# Patient Record
Sex: MALE | Race: WHITE | NOT HISPANIC OR LATINO
[De-identification: names, ages, dates, MRNs, and addresses within clinical notes are randomized per-mention and may not be internally consistent; named-entity substitution may affect disease eponyms.]

---

## 2017-01-11 ENCOUNTER — APPOINTMENT (OUTPATIENT)
Dept: CARDIOLOGY | Facility: CLINIC | Age: 57
End: 2017-01-11

## 2017-04-12 ENCOUNTER — APPOINTMENT (OUTPATIENT)
Dept: CARDIOLOGY | Facility: CLINIC | Age: 57
End: 2017-04-12

## 2017-08-09 ENCOUNTER — APPOINTMENT (OUTPATIENT)
Dept: CARDIOLOGY | Facility: CLINIC | Age: 57
End: 2017-08-09
Payer: COMMERCIAL

## 2017-08-09 PROCEDURE — 99406 BEHAV CHNG SMOKING 3-10 MIN: CPT

## 2017-08-09 PROCEDURE — 99214 OFFICE O/P EST MOD 30 MIN: CPT | Mod: 25

## 2017-11-16 ENCOUNTER — RX RENEWAL (OUTPATIENT)
Age: 57
End: 2017-11-16

## 2017-12-04 ENCOUNTER — MEDICATION RENEWAL (OUTPATIENT)
Age: 57
End: 2017-12-04

## 2018-01-09 ENCOUNTER — RX RENEWAL (OUTPATIENT)
Age: 58
End: 2018-01-09

## 2018-02-09 ENCOUNTER — RECORD ABSTRACTING (OUTPATIENT)
Age: 58
End: 2018-02-09

## 2018-02-09 DIAGNOSIS — R79.9 ABNORMAL FINDING OF BLOOD CHEMISTRY, UNSPECIFIED: ICD-10-CM

## 2018-02-09 DIAGNOSIS — Z82.49 FAMILY HISTORY OF ISCHEMIC HEART DISEASE AND OTHER DISEASES OF THE CIRCULATORY SYSTEM: ICD-10-CM

## 2018-02-14 ENCOUNTER — APPOINTMENT (OUTPATIENT)
Dept: CARDIOLOGY | Facility: CLINIC | Age: 58
End: 2018-02-14
Payer: COMMERCIAL

## 2018-02-14 ENCOUNTER — NON-APPOINTMENT (OUTPATIENT)
Age: 58
End: 2018-02-14

## 2018-02-14 ENCOUNTER — RECORD ABSTRACTING (OUTPATIENT)
Age: 58
End: 2018-02-14

## 2018-02-14 VITALS
SYSTOLIC BLOOD PRESSURE: 126 MMHG | WEIGHT: 179 LBS | RESPIRATION RATE: 16 BRPM | HEIGHT: 72 IN | OXYGEN SATURATION: 98 % | HEART RATE: 78 BPM | BODY MASS INDEX: 24.24 KG/M2 | DIASTOLIC BLOOD PRESSURE: 68 MMHG

## 2018-02-14 DIAGNOSIS — I25.2 OLD MYOCARDIAL INFARCTION: ICD-10-CM

## 2018-02-14 DIAGNOSIS — Z86.79 PERSONAL HISTORY OF OTHER DISEASES OF THE CIRCULATORY SYSTEM: ICD-10-CM

## 2018-02-14 DIAGNOSIS — F17.200 NICOTINE DEPENDENCE, UNSPECIFIED, UNCOMPLICATED: ICD-10-CM

## 2018-02-14 DIAGNOSIS — Z78.9 OTHER SPECIFIED HEALTH STATUS: ICD-10-CM

## 2018-02-14 PROCEDURE — 99214 OFFICE O/P EST MOD 30 MIN: CPT

## 2018-02-14 PROCEDURE — 93000 ELECTROCARDIOGRAM COMPLETE: CPT

## 2018-02-26 ENCOUNTER — APPOINTMENT (OUTPATIENT)
Dept: CARDIOLOGY | Facility: CLINIC | Age: 58
End: 2018-02-26

## 2018-02-28 ENCOUNTER — APPOINTMENT (OUTPATIENT)
Dept: CARDIOLOGY | Facility: CLINIC | Age: 58
End: 2018-02-28

## 2018-03-07 ENCOUNTER — RX RENEWAL (OUTPATIENT)
Age: 58
End: 2018-03-07

## 2018-03-19 ENCOUNTER — APPOINTMENT (OUTPATIENT)
Dept: CARDIOLOGY | Facility: CLINIC | Age: 58
End: 2018-03-19
Payer: COMMERCIAL

## 2018-03-19 PROCEDURE — 93306 TTE W/DOPPLER COMPLETE: CPT

## 2018-03-20 ENCOUNTER — RESULT REVIEW (OUTPATIENT)
Age: 58
End: 2018-03-20

## 2018-03-20 ENCOUNTER — APPOINTMENT (OUTPATIENT)
Dept: CARDIOLOGY | Facility: CLINIC | Age: 58
End: 2018-03-20

## 2018-04-14 ENCOUNTER — MEDICATION RENEWAL (OUTPATIENT)
Age: 58
End: 2018-04-14

## 2018-05-29 ENCOUNTER — MEDICATION RENEWAL (OUTPATIENT)
Age: 58
End: 2018-05-29

## 2018-06-07 ENCOUNTER — MEDICATION RENEWAL (OUTPATIENT)
Age: 58
End: 2018-06-07

## 2018-08-29 ENCOUNTER — APPOINTMENT (OUTPATIENT)
Dept: CARDIOLOGY | Facility: CLINIC | Age: 58
End: 2018-08-29
Payer: SELF-PAY

## 2018-08-29 PROCEDURE — 99214 OFFICE O/P EST MOD 30 MIN: CPT

## 2018-10-12 ENCOUNTER — RX RENEWAL (OUTPATIENT)
Age: 58
End: 2018-10-12

## 2018-10-22 ENCOUNTER — CLINICAL ADVICE (OUTPATIENT)
Age: 58
End: 2018-10-22

## 2018-10-22 DIAGNOSIS — N52.9 MALE ERECTILE DYSFUNCTION, UNSPECIFIED: ICD-10-CM

## 2019-03-07 ENCOUNTER — NON-APPOINTMENT (OUTPATIENT)
Age: 59
End: 2019-03-07

## 2019-03-07 ENCOUNTER — APPOINTMENT (OUTPATIENT)
Dept: CARDIOLOGY | Facility: CLINIC | Age: 59
End: 2019-03-07
Payer: COMMERCIAL

## 2019-03-07 VITALS
HEART RATE: 67 BPM | BODY MASS INDEX: 24.65 KG/M2 | WEIGHT: 182 LBS | OXYGEN SATURATION: 98 % | HEIGHT: 72 IN | DIASTOLIC BLOOD PRESSURE: 70 MMHG | SYSTOLIC BLOOD PRESSURE: 118 MMHG

## 2019-03-07 PROCEDURE — 93000 ELECTROCARDIOGRAM COMPLETE: CPT

## 2019-03-07 PROCEDURE — 99214 OFFICE O/P EST MOD 30 MIN: CPT

## 2019-03-07 RX ORDER — LISINOPRIL 10 MG/1
10 TABLET ORAL DAILY
Qty: 90 | Refills: 3 | Status: DISCONTINUED | COMMUNITY
End: 2019-03-07

## 2019-03-07 NOTE — ASSESSMENT
[FreeTextEntry1] : prior tests for reference:\par Abdominal aortic ultrasound showed no aortic aneurysm.\par \par Echocardiogram 5/11/2016.\par LV ejection fraction 45-50%. LVIDD 5.4 aortic root 3.6 left atrial size 3.3 mild mitral and tricuspid regurgitation. Ascending 3.8 cm.\par \par Myocardial perfusion imaging 6/14/2016 patient exercised for 9 minutes 10 seconds of the John protocol.  No evidence  of ischemia by EKG.  Wall motion analysis demonstrated moderate hypokinesis of the inferior region.  Left ventricular ejection fraction mildly reduced.  No significant change from study on 9/27/2013.  There is a moderate sized area of infarction in the inferior region\par \par  Echocardiogram 1213 2016, EF was around 50-55%. Hypokinetic inferior, inferoseptal region.\par \par He had exercise treadmill stress test. This showed equally local ST-T wave changes. No significant chest pain. No significant cardiac arrhythmias\par \par Reviewed on February 14, 2018.\par EKG ordered and interpreted by me on February 14, 2018. Indication coronary artery disease. Ischemic heart disease. Interpretation by me. Normal sinus rhythm. Inferior infarct of undetermined age.\par Labs January 2018 beers stable CBC, tsh\par August 2017. CBC was stable. The CMP was stable. Triglycerides 40. Serial 89. LDL 43. Total cholesterol 140. \par \par Reviewed on August 29, 2018 \par Echocardiogram. March 19, 2018, EF. 55%. Mild mitral regurgitation. Abnormal wall motion. Normal pulmonary pressures.\par CMP. May 4, 2018. Creatinine 0.87, potassium 4.3, sodium 141, LFT normal\par \par Reviewed on March 7, 2019\par EKG. Normal sinus rhythm. LVH. Nonspecific ST-T changes.\par \par

## 2019-03-07 NOTE — HISTORY OF PRESENT ILLNESS
[FreeTextEntry1] :  57-year-old.\par \par Continued smoking.\par No significant chest pain, PND, orthopnea, or pedal edema.\par Exertional fatigue and shortness of breath, stable.\par No palpitation, dizziness, or syncopal episode.\par Continue to aggressively work without any cardiovascular symptoms.\par Stable atypia.\par No recent hospital admission.\par No changes in medications.\par . He has no pedal edema. Continue to be aggressive with his work. His weight is stable. \par \par Other medical problems, which includes\par  \par Hyperlipidemia on statin therapy.\par \par History of myocardial infarction, status post right coronary artery drug eluding stent placement\par Mild to moderate reduction in LV ejection fraction with ischemic myopathy. Non-dilated. +1 functional status.\par Erectile dysfunction\par Cardiomyopathy: ischemic mild-mod reduction.\par nicotene addiction\par alcohol .

## 2019-03-07 NOTE — PHYSICAL EXAM
[General Appearance - Well Developed] : well developed [Normal Appearance] : normal appearance [Well Groomed] : well groomed [General Appearance - Well Nourished] : well nourished [No Deformities] : no deformities [General Appearance - In No Acute Distress] : no acute distress [Normal Conjunctiva] : the conjunctiva exhibited no abnormalities [No Oral Pallor] : no oral pallor [Normal Jugular Venous A Waves Present] : normal jugular venous A waves present [Normal Jugular Venous V Waves Present] : normal jugular venous V waves present [No Jugular Venous Murphy A Waves] : no jugular venous murphy A waves [Respiration, Rhythm And Depth] : normal respiratory rhythm and effort [Exaggerated Use Of Accessory Muscles For Inspiration] : no accessory muscle use [Auscultation Breath Sounds / Voice Sounds] : lungs were clear to auscultation bilaterally [FreeTextEntry1] : msm 1-2/6 mid systolic murmur. no gallop, rub, heave, click, normal PMI. no bruits. [Heart Rate And Rhythm] : heart rate and rhythm were normal [Heart Sounds] : normal S1 and S2 [Arterial Pulses Normal] : the arterial pulses were normal [Edema] : no peripheral edema present [Veins - Varicosity Changes] : no varicosital changes were noted in the lower extremities [Abdomen Soft] : soft [Abdomen Tenderness] : non-tender [Abnormal Walk] : normal gait [Gait - Sufficient For Exercise Testing] : the gait was sufficient for exercise testing [Nail Clubbing] : no clubbing of the fingernails [Cyanosis, Localized] : no localized cyanosis [Skin Color & Pigmentation] : normal skin color and pigmentation [] : no rash [Oriented To Time, Place, And Person] : oriented to person, place, and time [Affect] : the affect was normal [Mood] : the mood was normal [No Anxiety] : not feeling anxious

## 2019-03-07 NOTE — REASON FOR VISIT
[Follow-Up - Clinic] : a clinic follow-up of [Cardiomyopathy] : cardiomyopathy [Coronary Artery Disease] : coronary artery disease [Hyperlipidemia] : hyperlipidemia [Hypertension] : hypertension [FreeTextEntry1] : Review labs and echocardiogram.58 year old comes in for followup consultation.\par He is being evaluated to have kidney donation for transplant to his brother.\par He needs cardiac evaluation in presence of his history of coronary artery disease, prior myocardial infarction, and interventions.\par He continued to be active, but his job has changed and now he is more driving been doing physical activity as before.\par He has no chest pain. He has no significant PND, orthopnea, palpitation, dizziness, or syncopal episode.\par He has continued smoking in spite of understanding risks, and benefits.\par He has been compliant with his medications.\par He has not had any labs done.\par No recent hospitalization

## 2019-03-07 NOTE — DISCUSSION/SUMMARY
[FreeTextEntry1] : Assessment and suggestions.\par \par #1 coronary artery disease. Native vessel. Prior myocardial infarction. Coronary intervention. Right coronary artery. Initial significant reduction in LV systolic function with ischemic cardiomyopathy. Subsequent improvement in ejection fraction when last checked. No clinical signs of LV systolic dysfunction. Clinical signs of mitral regurgitation noted. His exercise tolerance has decreased.\par He also needs cardiac clearance prior to moderate to high risk surgery in relation to kidney donation.\par He has been recommended to how exercise tolerance test by transplant team.\par He also needs an ejection fraction and presence of history of ischemic cardiomyopathy. Also, followup on severity of mitral regurgitation by echocardiogram.\par A presence of coronary artery disease, prior ischemic cardiomyopathy, prior intervention, abnormal EKG with LVH. He is recommended to have a stress myocardial perfusion scan/nuclear stress test to further evaluate and rule out any significant worsening of his ischemic heart disease and ascertain any perioperative risk more accurately.\par He also needs labs as advised.\par \par Secondary prevention reviewed, which includes\par \par #1 Counseling regarding low saturated fat, salt and carbohydrate intake was reviewed. Active lifestyle and regular. Exercise along with weight management is advised.\par \par Smoking cessation. Counseling done for over 3-5 minutes. Relationship with ASCVD, and associated morbidity, mortality was reviewed. Options available discussed.\par \par Dual antiplatelet agents , due to the recurrent coronary event, and ischemic myopathy along with low risk of bleeding and continued risk factors, were reviewed. \par \par #2 hyperlipidemia. Continue atorvastatin will along with dietary restrictions.\par #3 essential hypertension. . very well controlled. Continue present medications.\par #4 decreasing alcohol intake. Strongly recommended \par #4 high-grade symptoms for which he will contact 911 immediately has been reviewed.\par \par All the above were at length reviewed. Answered all the questions. Thank you very much for this kind referral. Please do not hesitate to give me a call for any question.\par Part of this transcription was done with voice recognition software and phonetically similar errors are common. I apologize for that. Please donot hesitate to call for any questions due to above.\par \par Followup after above tests

## 2019-03-13 ENCOUNTER — APPOINTMENT (OUTPATIENT)
Dept: CARDIOLOGY | Facility: CLINIC | Age: 59
End: 2019-03-13
Payer: COMMERCIAL

## 2019-03-13 DIAGNOSIS — Z78.9 OTHER SPECIFIED HEALTH STATUS: ICD-10-CM

## 2019-03-13 DIAGNOSIS — R06.02 SHORTNESS OF BREATH: ICD-10-CM

## 2019-03-13 PROCEDURE — A9502: CPT

## 2019-03-13 PROCEDURE — 93015 CV STRESS TEST SUPVJ I&R: CPT

## 2019-03-13 PROCEDURE — 78452 HT MUSCLE IMAGE SPECT MULT: CPT

## 2019-03-13 RX ORDER — B-COMPLEX WITH VITAMIN C
TABLET ORAL
Refills: 0 | Status: DISCONTINUED | COMMUNITY
End: 2019-03-13

## 2019-03-22 ENCOUNTER — APPOINTMENT (OUTPATIENT)
Dept: CARDIOLOGY | Facility: CLINIC | Age: 59
End: 2019-03-22
Payer: COMMERCIAL

## 2019-03-22 PROCEDURE — 93306 TTE W/DOPPLER COMPLETE: CPT

## 2019-03-23 ENCOUNTER — RX RENEWAL (OUTPATIENT)
Age: 59
End: 2019-03-23

## 2019-03-29 ENCOUNTER — APPOINTMENT (OUTPATIENT)
Dept: CARDIOLOGY | Facility: CLINIC | Age: 59
End: 2019-03-29
Payer: COMMERCIAL

## 2019-03-29 VITALS
BODY MASS INDEX: 23.86 KG/M2 | SYSTOLIC BLOOD PRESSURE: 128 MMHG | HEART RATE: 66 BPM | WEIGHT: 180 LBS | DIASTOLIC BLOOD PRESSURE: 62 MMHG | HEIGHT: 73 IN

## 2019-03-29 DIAGNOSIS — Z95.5 PRESENCE OF CORONARY ANGIOPLASTY IMPLANT AND GRAFT: ICD-10-CM

## 2019-03-29 PROCEDURE — 99214 OFFICE O/P EST MOD 30 MIN: CPT

## 2019-03-29 NOTE — REASON FOR VISIT
[Follow-Up - Clinic] : a clinic follow-up of [Cardiomyopathy] : cardiomyopathy [Coronary Artery Disease] : coronary artery disease [Hyperlipidemia] : hyperlipidemia [Hypertension] : hypertension [FreeTextEntry1] : PENELOPE ZHOU  is a 58 year M  who presents today Mar 29, 2019 in clinical follow-up to review recent cardiovascular testing. Since last seen there has been no recent illness or hospital stay. Overall feeling well. \par He is being evaluated to have kidney donation for transplant to his brother.\par He continued to be active, but his job has changed and now he is more driving been doing physical activity as before.\par Continued smoking.\par \par Today he denies chest pain, pressure, unusual shortness of breath, lightheadedness, dizziness, near syncope or syncope. \par \par Hyperlipidemia on statin therapy with Atorvastatin 80mg QD.\par \par History of myocardial infarction, status post right coronary artery drug eluding stent placement\par Mild to moderate reduction in LV ejection fraction with ischemic myopathy. \par \par Erectile dysfunction\par \par Cardiomyopathy: ischemic mild-mod reduction. \par \par Echocardiogram March 22, 2019 EF 50-55%, mild MR\par \par Nuclear stress test March 13, 2019 mod defects in inf/inferolateral inferoseptal wall consistent with infarct. No ischemia \par \par Labs March 2019 WBC 7.7, Hgb 14.2, Hct 42, Platelets 254, Sodium 143, potassium 4.7, creatinine 0.86, BUN 19, AST 19, ALT 17, triglyceride 41, HDL 55, LDL 60, total cholesterol 123\par \par Abdominal aortic ultrasound showed no aortic aneurysm.\par \par Echocardiogram 5/11/2016.\par LV ejection fraction 45-50%. LVIDD 5.4 aortic root 3.6 left atrial size 3.3 mild mitral and tricuspid regurgitation. Ascending 3.8 cm.\par \par Myocardial perfusion imaging 6/14/2016 patient exercised for 9 minutes 10 seconds of the John protocol.  No evidence  of ischemia by EKG.  Wall motion analysis demonstrated moderate hypokinesis of the inferior region.  Left ventricular ejection fraction mildly reduced.  No significant change from study on 9/27/2013.  There is a moderate sized area of infarction in the inferior region\par \par  Echocardiogram 1213 2016, EF was around 50-55%. Hypokinetic inferior, inferoseptal region.\par \par EKG ordered and interpreted by me on February 14, 2018. Indication coronary artery disease. Ischemic heart disease. Interpretation by me. Normal sinus rhythm. Inferior infarct of undetermined age.\par Labs January 2018 beers stable CBC, tsh\par August 2017. CBC was stable. The CMP was stable. Triglycerides 40. Serial 89. LDL 43. Total cholesterol 140. \par \par Echocardiogram. March 19, 2018, EF. 55%. Mild mitral regurgitation. Abnormal wall motion. Normal pulmonary pressures.\par CMP. May 4, 2018. Creatinine 0.87, potassium 4.3, sodium 141, LFT normal\par \par EKG. Normal sinus rhythm. LVH. Nonspecific ST-T changes.\par \par \par

## 2019-03-29 NOTE — DISCUSSION/SUMMARY
[FreeTextEntry1] : PENELOPE ZHOU  is a 58 year M  who presents today Mar 29, 2019 with the above history and the following active issues. \par \par Coronary artery disease. Native vessel. Prior myocardial infarction. Coronary intervention. Right coronary artery. Initial significant reduction in LV systolic function with ischemic cardiomyopathy. Subsequent improvement in ejection fraction by echo 3/2019. No clinical signs of LV systolic dysfunction. \par Presence of coronary artery disease, prior ischemic cardiomyopathy, prior intervention, abnormal EKG with LVH. \par Echocardiogram with EF 50-55%. Nuclear stress test without ischemia.\par \par \par Counseling regarding low saturated fat, salt and carbohydrate intake was reviewed. Active lifestyle and regular. Exercise along with weight management is advised.\par \par Smoking cessation. Relationship with ASCVD, and associated morbidity, mortality was reviewed. Options available discussed.\par \par Dual antiplatelet agents , due to the recurrent coronary event, and ischemic myopathy along with low risk of bleeding and continued risk factors, were reviewed. \par \par Hyperlipidemia. Continue atorvastatin will along with dietary restrictions.\par \par Essential hypertension. Very well controlled. Continue present medications.\par \par Preoperative status  \par date to be determined\par At present, there are no active cardiac conditions. \par No recent unstable coronary syndromes, decompensated heart failure, severe valvular heart disease or significant dysrhythmias.  \par Baseline functional status is good.    \par The clinical benefit of the proposed procedure outweighs the associated cardiovascular risk.  \par Risk not attenuated with further CV testing.  \par Prior testing as outlined above.\par Optimized from a cardiovascular perspective.\par Minimize time off ASA\par Continue beta blocker\par \par Red flag symptoms which would warrant sooner emergent evaluation reviewed with the patient. \par Questions and concerns were addressed and answered. \par \par Clinical follow-up with Dr. More in 6 months unless symptoms warrant sooner emergent evaluation\par \par Sincerely,\par \par Crystal Fowler PA-C\par Patients history, testing and plan reviewed with supervising MD: Dr. Nikko Wagoner

## 2019-03-29 NOTE — PHYSICAL EXAM
[General Appearance - Well Developed] : well developed [Normal Appearance] : normal appearance [Well Groomed] : well groomed [General Appearance - Well Nourished] : well nourished [No Deformities] : no deformities [General Appearance - In No Acute Distress] : no acute distress [Normal Conjunctiva] : the conjunctiva exhibited no abnormalities [No Oral Pallor] : no oral pallor [Normal Jugular Venous A Waves Present] : normal jugular venous A waves present [Normal Jugular Venous V Waves Present] : normal jugular venous V waves present [No Jugular Venous Murphy A Waves] : no jugular venous murpyh A waves [Respiration, Rhythm And Depth] : normal respiratory rhythm and effort [Exaggerated Use Of Accessory Muscles For Inspiration] : no accessory muscle use [Auscultation Breath Sounds / Voice Sounds] : lungs were clear to auscultation bilaterally [FreeTextEntry1] : msm 1-2/6 mid systolic murmur. no gallop, rub, heave, click, normal PMI. no bruits. [Heart Rate And Rhythm] : heart rate and rhythm were normal [Heart Sounds] : normal S1 and S2 [Arterial Pulses Normal] : the arterial pulses were normal [Edema] : no peripheral edema present [Veins - Varicosity Changes] : no varicosital changes were noted in the lower extremities [Abdomen Soft] : soft [Abdomen Tenderness] : non-tender [Abnormal Walk] : normal gait [Gait - Sufficient For Exercise Testing] : the gait was sufficient for exercise testing [Nail Clubbing] : no clubbing of the fingernails [Cyanosis, Localized] : no localized cyanosis [Skin Color & Pigmentation] : normal skin color and pigmentation [] : no rash [Oriented To Time, Place, And Person] : oriented to person, place, and time [Affect] : the affect was normal [Mood] : the mood was normal [No Anxiety] : not feeling anxious

## 2019-05-23 ENCOUNTER — MEDICATION RENEWAL (OUTPATIENT)
Age: 59
End: 2019-05-23

## 2019-07-29 ENCOUNTER — MEDICATION RENEWAL (OUTPATIENT)
Age: 59
End: 2019-07-29

## 2019-08-06 ENCOUNTER — EMERGENCY (EMERGENCY)
Facility: HOSPITAL | Age: 59
LOS: 1 days | End: 2019-08-06
Admitting: EMERGENCY MEDICINE
Payer: OTHER MISCELLANEOUS

## 2019-08-06 PROCEDURE — 99282 EMERGENCY DEPT VISIT SF MDM: CPT

## 2019-08-08 ENCOUNTER — MEDICATION RENEWAL (OUTPATIENT)
Age: 59
End: 2019-08-08

## 2019-09-21 ENCOUNTER — RX RENEWAL (OUTPATIENT)
Age: 59
End: 2019-09-21

## 2019-09-30 ENCOUNTER — NON-APPOINTMENT (OUTPATIENT)
Age: 59
End: 2019-09-30

## 2019-09-30 ENCOUNTER — APPOINTMENT (OUTPATIENT)
Dept: CARDIOLOGY | Facility: CLINIC | Age: 59
End: 2019-09-30
Payer: COMMERCIAL

## 2019-09-30 VITALS
DIASTOLIC BLOOD PRESSURE: 68 MMHG | OXYGEN SATURATION: 96 % | HEIGHT: 73 IN | SYSTOLIC BLOOD PRESSURE: 132 MMHG | WEIGHT: 170 LBS | HEART RATE: 65 BPM | BODY MASS INDEX: 22.53 KG/M2

## 2019-09-30 DIAGNOSIS — Z01.810 ENCOUNTER FOR PREPROCEDURAL CARDIOVASCULAR EXAMINATION: ICD-10-CM

## 2019-09-30 PROCEDURE — 99215 OFFICE O/P EST HI 40 MIN: CPT

## 2019-09-30 PROCEDURE — 93000 ELECTROCARDIOGRAM COMPLETE: CPT

## 2019-10-09 NOTE — REASON FOR VISIT
[Follow-Up - Clinic] : a clinic follow-up of [Coronary Artery Disease] : coronary artery disease [Cardiomyopathy] : cardiomyopathy [Hyperlipidemia] : hyperlipidemia [Hypertension] : hypertension [FreeTextEntry1] : PENELOPE ZHOU  is a 58 year M  who presents today  in clinical follow-up for preoperative cardiac assessment prior to upper endoscopy and possible dilatation. Since last seen there has been no recent illness or hospital stay. Overall feeling well. \par He continued to be active, but his job has changed and now he is more driving been doing physical activity as before.\par Continued smoking.\par Today he denies chest pain, pressure, unusual shortness of breath, lightheadedness, dizziness, near syncope or syncope. \par \par

## 2019-10-09 NOTE — ASSESSMENT
[FreeTextEntry1] : past tests for reference: \par \par Echocardiogram March 22, 2019 EF 50-55%, mild MR\par \par Nuclear stress test March 13, 2019 mod defects in inf/inferolateral inferoseptal wall consistent with infarct. No ischemia \par \par Labs March 2019 WBC 7.7, Hgb 14.2, Hct 42, Platelets 254, Sodium 143, potassium 4.7, creatinine 0.86, BUN 19, AST 19, ALT 17, triglyceride 41, HDL 55, LDL 60, total cholesterol 123\par \par Abdominal aortic ultrasound showed no aortic aneurysm.\par \par Echocardiogram 5/11/2016.\par LV ejection fraction 45-50%. LVIDD 5.4 aortic root 3.6 left atrial size 3.3 mild mitral and tricuspid regurgitation. Ascending 3.8 cm.\par \par Myocardial perfusion imaging 6/14/2016 patient exercised for 9 minutes 10 seconds of the John protocol.  No evidence  of ischemia by EKG.  Wall motion analysis demonstrated moderate hypokinesis of the inferior region.  Left ventricular ejection fraction mildly reduced.  No significant change from study on 9/27/2013.  There is a moderate sized area of infarction in the inferior region\par \par  Echocardiogram 1213 2016, EF was around 50-55%. Hypokinetic inferior, inferoseptal region.\par \par EKG ordered and interpreted by me on February 14, 2018. Indication coronary artery disease. Ischemic heart disease. Interpretation by me. Normal sinus rhythm. Inferior infarct of undetermined age.\par Labs January 2018 beers stable CBC, tsh\par August 2017. CBC was stable. The CMP was stable. Triglycerides 40. Serial 89. LDL 43. Total cholesterol 140. \par \par Echocardiogram. March 19, 2018, EF. 55%. Mild mitral regurgitation. Abnormal wall motion. Normal pulmonary pressures.\par CMP. May 4, 2018. Creatinine 0.87, potassium 4.3, sodium 141, LFT normal\par \par Reviewed on September 30, 2019\par EKG  ordered and interpreted by me. Indication coronary artery disease. Preoperative cardiac assessment. Hypertension. Interpretation. Normal sinus rhythm. Left and hypertrophic. Old inferior infarct of undetermined age. Nonspecific ST-T changes.\par \par \par

## 2019-10-09 NOTE — ADDENDUM
[FreeTextEntry1] : Now planned for ENT surgery on October 18, 2019.\par I was asked to do addendum to my preoperative assessment done on September 30, 2019.\par As long as no clinical changes in his cardiac symptoms and function since last seen stable from cardiac point of  review with preoperative recommendations as noted above.\par Call us for any questions

## 2019-10-09 NOTE — DISCUSSION/SUMMARY
[FreeTextEntry1] : PENELOPE ZHOU  is a 58 year M  who presents today with the above history and the following active issues. \par \par Coronary artery disease. Native vessel. Prior myocardial infarction. Coronary intervention. Right coronary artery. Initial significant reduction in LV systolic function with ischemic cardiomyopathy. Subsequent improvement in ejection fraction by echo 3/2019. No clinical signs of LV systolic dysfunction. \par Presence of coronary artery disease, prior ischemic cardiomyopathy, prior intervention, abnormal EKG with LVH. \par Echocardiogram with EF 50-55%. Nuclear stress test without ischemia.\par \par Smoking cessation. Relationship with ASCVD, and associated morbidity, mortality was reviewed. Options available discussed.\par \par Dual antiplatelet agents , due to the recurrent coronary event, and ischemic myopathy along with low risk of bleeding and continued risk factors, were reviewed. \par \par Hyperlipidemia. Continue atorvastatin will along with dietary restrictions.\par \par Essential hypertension. Very well controlled. Continue present medications.\par \par Preoperative status  \par date to be determined\par At present, there are no active cardiac conditions. \par No recent unstable coronary syndromes, decompensated heart failure, severe valvular heart disease or significant dysrhythmias.  \par Baseline functional status is good.    \par The clinical benefit of the proposed procedure outweighs the associated cardiovascular risk.  \par Risk not attenuated with further CV testing.  \par Prior testing as outlined above.\par Optimized from a cardiovascular perspective.\par He can hold his Plavix for 5 days as recommended.\par If possible continue with aspirin 81 mg. If need to hold aspirin minimize time of aspirin to decrease perioperative cardiovascular risk.\par Continue beta blocker\par \par Red flag symptoms which would warrant sooner emergent evaluation reviewed with the patient. \par Questions and concerns were addressed and answered. \par \par Counseling regarding low saturated fat, salt and carbohydrate intake was reviewed. Active lifestyle and regular. Exercise along with weight management is advised.\par All the above were at length reviewed. Answered all the questions. Thank you very much for this kind referral. Please do not hesitate to give me a call for any question.\par Part of this transcription was done with voice recognition software and phonetically similar errors are common. I apologize for that. Please do not hesitate to call for any questions due to above.\par

## 2019-10-09 NOTE — REASON FOR VISIT
[Follow-Up - Clinic] : a clinic follow-up of [Cardiomyopathy] : cardiomyopathy [Coronary Artery Disease] : coronary artery disease [Hyperlipidemia] : hyperlipidemia [Hypertension] : hypertension [FreeTextEntry1] : PENELOPE ZHOU  is a 58 year M  who presents today  in clinical follow-up for preoperative cardiac assessment prior to upper endoscopy and possible dilatation. Since last seen there has been no recent illness or hospital stay. Overall feeling well. \par He continued to be active, but his job has changed and now he is more driving been doing physical activity as before.\par Continued smoking.\par Today he denies chest pain, pressure, unusual shortness of breath, lightheadedness, dizziness, near syncope or syncope. \par \par

## 2019-10-09 NOTE — HISTORY OF PRESENT ILLNESS
[FreeTextEntry1] : Hyperlipidemia on statin therapy with Atorvastatin 80mg QD.\par \par History of myocardial infarction, status post right coronary artery drug eluding stent placement\par Mild to moderate reduction in LV ejection fraction with ischemic myopathy. \par \par Erectile dysfunction\par \par Cardiomyopathy: ischemic mild-mod reduction. \par \par nicotene addiction

## 2019-10-09 NOTE — PHYSICAL EXAM
[General Appearance - Well Developed] : well developed [Normal Appearance] : normal appearance [Well Groomed] : well groomed [General Appearance - Well Nourished] : well nourished [No Deformities] : no deformities [General Appearance - In No Acute Distress] : no acute distress [Normal Conjunctiva] : the conjunctiva exhibited no abnormalities [No Oral Pallor] : no oral pallor [Normal Jugular Venous A Waves Present] : normal jugular venous A waves present [Normal Jugular Venous V Waves Present] : normal jugular venous V waves present [No Jugular Venous Murphy A Waves] : no jugular venous murphy A waves [Respiration, Rhythm And Depth] : normal respiratory rhythm and effort [Auscultation Breath Sounds / Voice Sounds] : lungs were clear to auscultation bilaterally [Exaggerated Use Of Accessory Muscles For Inspiration] : no accessory muscle use [Heart Rate And Rhythm] : heart rate and rhythm were normal [Heart Sounds] : normal S1 and S2 [Edema] : no peripheral edema present [Arterial Pulses Normal] : the arterial pulses were normal [Abdomen Soft] : soft [Veins - Varicosity Changes] : no varicosital changes were noted in the lower extremities [Abdomen Tenderness] : non-tender [Abnormal Walk] : normal gait [Gait - Sufficient For Exercise Testing] : the gait was sufficient for exercise testing [Cyanosis, Localized] : no localized cyanosis [Nail Clubbing] : no clubbing of the fingernails [Skin Color & Pigmentation] : normal skin color and pigmentation [] : no rash [Oriented To Time, Place, And Person] : oriented to person, place, and time [Mood] : the mood was normal [Affect] : the affect was normal [No Anxiety] : not feeling anxious [FreeTextEntry1] : msm 1-2/6 mid systolic murmur. no gallop, rub, heave, click, normal PMI. no bruits.

## 2019-10-09 NOTE — PHYSICAL EXAM
[General Appearance - Well Developed] : well developed [Normal Appearance] : normal appearance [Well Groomed] : well groomed [No Deformities] : no deformities [General Appearance - Well Nourished] : well nourished [General Appearance - In No Acute Distress] : no acute distress [Normal Conjunctiva] : the conjunctiva exhibited no abnormalities [No Oral Pallor] : no oral pallor [Normal Jugular Venous A Waves Present] : normal jugular venous A waves present [Normal Jugular Venous V Waves Present] : normal jugular venous V waves present [No Jugular Venous Murphy A Waves] : no jugular venous murphy A waves [Respiration, Rhythm And Depth] : normal respiratory rhythm and effort [Auscultation Breath Sounds / Voice Sounds] : lungs were clear to auscultation bilaterally [Exaggerated Use Of Accessory Muscles For Inspiration] : no accessory muscle use [Heart Sounds] : normal S1 and S2 [Heart Rate And Rhythm] : heart rate and rhythm were normal [Arterial Pulses Normal] : the arterial pulses were normal [Edema] : no peripheral edema present [Veins - Varicosity Changes] : no varicosital changes were noted in the lower extremities [Abdomen Soft] : soft [Abdomen Tenderness] : non-tender [Abnormal Walk] : normal gait [Gait - Sufficient For Exercise Testing] : the gait was sufficient for exercise testing [Nail Clubbing] : no clubbing of the fingernails [Cyanosis, Localized] : no localized cyanosis [Skin Color & Pigmentation] : normal skin color and pigmentation [] : no rash [Oriented To Time, Place, And Person] : oriented to person, place, and time [Mood] : the mood was normal [Affect] : the affect was normal [No Anxiety] : not feeling anxious [FreeTextEntry1] : msm 1-2/6 mid systolic murmur. no gallop, rub, heave, click, normal PMI. no bruits.

## 2019-10-14 PROBLEM — Z00.00 ENCOUNTER FOR PREVENTIVE HEALTH EXAMINATION: Noted: 2019-10-14

## 2019-10-16 ENCOUNTER — OUTPATIENT (OUTPATIENT)
Dept: OUTPATIENT SERVICES | Facility: HOSPITAL | Age: 59
LOS: 1 days | End: 2019-10-16

## 2019-10-17 ENCOUNTER — APPOINTMENT (OUTPATIENT)
Dept: CT IMAGING | Facility: CLINIC | Age: 59
End: 2019-10-17
Payer: COMMERCIAL

## 2019-10-17 PROCEDURE — Q9967D: CUSTOM

## 2019-10-17 PROCEDURE — 82565A: CUSTOM | Mod: QW

## 2019-10-17 PROCEDURE — 70491 CT SOFT TISSUE NECK W/DYE: CPT

## 2019-10-18 ENCOUNTER — OUTPATIENT (OUTPATIENT)
Dept: OUTPATIENT SERVICES | Facility: HOSPITAL | Age: 59
LOS: 1 days | End: 2019-10-18

## 2020-02-21 ENCOUNTER — APPOINTMENT (OUTPATIENT)
Dept: CT IMAGING | Facility: CLINIC | Age: 60
End: 2020-02-21
Payer: COMMERCIAL

## 2020-02-21 PROCEDURE — Q9967B: CUSTOM

## 2020-02-21 PROCEDURE — 70492 CT SFT TSUE NCK W/O & W/DYE: CPT

## 2020-02-21 PROCEDURE — 82565A: CUSTOM | Mod: QW

## 2020-03-23 ENCOUNTER — RX RENEWAL (OUTPATIENT)
Age: 60
End: 2020-03-23

## 2020-03-23 ENCOUNTER — APPOINTMENT (OUTPATIENT)
Dept: CARDIOLOGY | Facility: CLINIC | Age: 60
End: 2020-03-23

## 2020-07-05 ENCOUNTER — RX RENEWAL (OUTPATIENT)
Age: 60
End: 2020-07-05

## 2020-07-13 ENCOUNTER — RX RENEWAL (OUTPATIENT)
Age: 60
End: 2020-07-13

## 2020-09-16 ENCOUNTER — INPATIENT (INPATIENT)
Facility: HOSPITAL | Age: 60
LOS: 8 days | Discharge: HOME HEALTH | End: 2020-09-25
Attending: STUDENT IN AN ORGANIZED HEALTH CARE EDUCATION/TRAINING PROGRAM
Payer: COMMERCIAL

## 2020-09-16 PROCEDURE — 71045 X-RAY EXAM CHEST 1 VIEW: CPT | Mod: 26

## 2020-09-16 PROCEDURE — 99285 EMERGENCY DEPT VISIT HI MDM: CPT

## 2020-09-16 PROCEDURE — 93010 ELECTROCARDIOGRAM REPORT: CPT

## 2020-09-16 PROCEDURE — 71045 X-RAY EXAM CHEST 1 VIEW: CPT | Mod: 26,77

## 2020-09-17 PROCEDURE — 71045 X-RAY EXAM CHEST 1 VIEW: CPT | Mod: 26

## 2020-09-18 ENCOUNTER — APPOINTMENT (OUTPATIENT)
Dept: CARDIOLOGY | Facility: CLINIC | Age: 60
End: 2020-09-18

## 2020-09-18 PROCEDURE — 71045 X-RAY EXAM CHEST 1 VIEW: CPT | Mod: 26

## 2020-09-19 PROCEDURE — 71045 X-RAY EXAM CHEST 1 VIEW: CPT | Mod: 26

## 2020-09-19 PROCEDURE — 71045 X-RAY EXAM CHEST 1 VIEW: CPT | Mod: 26,77

## 2020-09-21 ENCOUNTER — OUTPATIENT (OUTPATIENT)
Dept: OUTPATIENT SERVICES | Facility: HOSPITAL | Age: 60
LOS: 1 days | End: 2020-09-21

## 2020-09-21 PROCEDURE — 71250 CT THORAX DX C-: CPT | Mod: 26

## 2020-09-21 PROCEDURE — 74230 X-RAY XM SWLNG FUNCJ C+: CPT | Mod: 26

## 2020-09-22 ENCOUNTER — OUTPATIENT (OUTPATIENT)
Dept: OUTPATIENT SERVICES | Facility: HOSPITAL | Age: 60
LOS: 1 days | End: 2020-09-22

## 2020-09-23 ENCOUNTER — OUTPATIENT (OUTPATIENT)
Dept: OUTPATIENT SERVICES | Facility: HOSPITAL | Age: 60
LOS: 1 days | End: 2020-09-23

## 2020-09-24 ENCOUNTER — OUTPATIENT (OUTPATIENT)
Dept: OUTPATIENT SERVICES | Facility: HOSPITAL | Age: 60
LOS: 1 days | End: 2020-09-24

## 2020-09-28 ENCOUNTER — OUTPATIENT (OUTPATIENT)
Dept: OUTPATIENT SERVICES | Facility: HOSPITAL | Age: 60
LOS: 1 days | End: 2020-09-28

## 2020-10-26 ENCOUNTER — NON-APPOINTMENT (OUTPATIENT)
Age: 60
End: 2020-10-26

## 2020-11-02 ENCOUNTER — APPOINTMENT (OUTPATIENT)
Dept: CARDIOLOGY | Facility: CLINIC | Age: 60
End: 2020-11-02
Payer: COMMERCIAL

## 2020-11-02 ENCOUNTER — NON-APPOINTMENT (OUTPATIENT)
Age: 60
End: 2020-11-02

## 2020-11-02 VITALS
DIASTOLIC BLOOD PRESSURE: 60 MMHG | HEIGHT: 73 IN | SYSTOLIC BLOOD PRESSURE: 102 MMHG | OXYGEN SATURATION: 99 % | HEART RATE: 71 BPM | BODY MASS INDEX: 19.88 KG/M2 | WEIGHT: 150 LBS

## 2020-11-02 PROCEDURE — 93000 ELECTROCARDIOGRAM COMPLETE: CPT

## 2020-11-02 PROCEDURE — 99072 ADDL SUPL MATRL&STAF TM PHE: CPT

## 2020-11-02 PROCEDURE — 99214 OFFICE O/P EST MOD 30 MIN: CPT

## 2020-11-02 NOTE — ASSESSMENT
[FreeTextEntry1] : past tests for reference: \par \par Echocardiogram March 22, 2019 EF 50-55%, mild MR\par \par Nuclear stress test March 13, 2019 mod defects in inf/inferolateral inferoseptal wall consistent with infarct. No ischemia \par \par Labs March 2019 WBC 7.7, Hgb 14.2, Hct 42, Platelets 254, Sodium 143, potassium 4.7, creatinine 0.86, BUN 19, AST 19, ALT 17, triglyceride 41, HDL 55, LDL 60, total cholesterol 123\par \par Abdominal aortic ultrasound showed no aortic aneurysm.\par \par Echocardiogram 5/11/2016.\par LV ejection fraction 45-50%. LVIDD 5.4 aortic root 3.6 left atrial size 3.3 mild mitral and tricuspid regurgitation. Ascending 3.8 cm.\par \par Myocardial perfusion imaging 6/14/2016 patient exercised for 9 minutes 10 seconds of the John protocol.  No evidence  of ischemia by EKG.  Wall motion analysis demonstrated moderate hypokinesis of the inferior region.  Left ventricular ejection fraction mildly reduced.  No significant change from study on 9/27/2013.  There is a moderate sized area of infarction in the inferior region\par \par  Echocardiogram 1213 2016, EF was around 50-55%. Hypokinetic inferior, inferoseptal region.\par \par EKG ordered and interpreted by me on February 14, 2018. Indication coronary artery disease. Ischemic heart disease. Interpretation by me. Normal sinus rhythm. Inferior infarct of undetermined age.\par Labs January 2018 beers stable CBC, tsh\par August 2017. CBC was stable. The CMP was stable. Triglycerides 40. Serial 89. LDL 43. Total cholesterol 140. \par \par Echocardiogram. March 19, 2018, EF. 55%. Mild mitral regurgitation. Abnormal wall motion. Normal pulmonary pressures.\par CMP. May 4, 2018. Creatinine 0.87, potassium 4.3, sodium 141, LFT normal\par \par Reviewed on September 30, 2019\par EKG  ordered and interpreted by me. Indication coronary artery disease. Preoperative cardiac assessment. Hypertension. Interpretation. Normal sinus rhythm. Left and hypertrophic. Old inferior infarct of undetermined age. Nonspecific ST-T changes.\par \par Reviewed on November 2, 2020\par EKG November 2, 2020 as noted above\par Labs done August 6, 2020.  Stable CBC.  Sodium 134 creatinine 0.93 total cholesterol 131 LDL 53 HDL 65 triglycerides 65\par

## 2020-11-02 NOTE — DISCUSSION/SUMMARY
[FreeTextEntry1] : PENELOPE ZHOU  is a 59 year M  who presents today with the above history and the following active issues. 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 Native vessel. Prior myocardial infarction. Coronary intervention. Right coronary artery. Initial significant reduction in LV systolic function with ischemic cardiomyopathy. Subsequent improvement in ejection fraction by echo 3/2019.  Echocardiogram will be repeated in presence of\par coronary artery disease, prior ischemic cardiomyopathy, prior intervention, abnormal EKG with LVH. \par \par Smoking cessation. Relationship with ASCVD, and associated morbidity, mortality was reviewed. Options available discussed.\par \par Dual antiplatelet agents , due to the recurrent coronary event, and ischemic myopathy along with low risk of bleeding and continued risk factors, were reviewed.  We can consider discontinuation of the dual antiplatelet agent if he has any bleeding complications.\par \par Hyperlipidemia. Continue atorvastatin will along with dietary restrictions.  Labs ordered.\par \par Essential hypertension. Very well controlled. Continue present medications.  No evidence of CHF CKD.\par \par \par \par Counseling regarding low saturated fat, salt and carbohydrate intake was reviewed. Active lifestyle and regular. Exercise along with weight management is advised.\par All the above were at length reviewed. Answered all the questions. Thank you very much for this kind referral. Please do not hesitate to give me a call for any question.\par Part of this transcription was done with voice recognition software and phonetically similar errors are common. I apologize for that. Please do not hesitate to call for any questions due to above.\par

## 2020-11-02 NOTE — PHYSICAL EXAM
[General Appearance - Well Developed] : well developed [Normal Appearance] : normal appearance [Well Groomed] : well groomed [General Appearance - Well Nourished] : well nourished [No Deformities] : no deformities [General Appearance - In No Acute Distress] : no acute distress [Normal Conjunctiva] : the conjunctiva exhibited no abnormalities [No Oral Pallor] : no oral pallor [Respiration, Rhythm And Depth] : normal respiratory rhythm and effort [Exaggerated Use Of Accessory Muscles For Inspiration] : no accessory muscle use [Auscultation Breath Sounds / Voice Sounds] : lungs were clear to auscultation bilaterally [Heart Rate And Rhythm] : heart rate and rhythm were normal [Heart Sounds] : normal S1 and S2 [Arterial Pulses Normal] : the arterial pulses were normal [Edema] : no peripheral edema present [Veins - Varicosity Changes] : no varicosital changes were noted in the lower extremities [Abdomen Soft] : soft [Abdomen Tenderness] : non-tender [Abnormal Walk] : normal gait [Gait - Sufficient For Exercise Testing] : the gait was sufficient for exercise testing [Nail Clubbing] : no clubbing of the fingernails [Cyanosis, Localized] : no localized cyanosis [Skin Color & Pigmentation] : normal skin color and pigmentation [] : no rash [Oriented To Time, Place, And Person] : oriented to person, place, and time [Affect] : the affect was normal [Mood] : the mood was normal [No Anxiety] : not feeling anxious [FreeTextEntry1] : msm 1-2/6 mid systolic murmur. no gallop, rub, heave, click, normal PMI. no bruits.

## 2020-11-02 NOTE — REASON FOR VISIT
[Follow-Up - Clinic] : a clinic follow-up of [Cardiomyopathy] : cardiomyopathy [Coronary Artery Disease] : coronary artery disease [Hyperlipidemia] : hyperlipidemia [Hypertension] : hypertension [FreeTextEntry1] : 59-year-old gentleman comes to follow-up after recent treatment for his cancer follow-up vocal cords.  Had tracheostomy and radiation..\par Continued smoking.\par Today he denies chest pain, pressure, unusual shortness of breath, lightheadedness, dizziness, near syncope or syncope. \par No hospital admission from cardiac point of\par

## 2020-11-06 ENCOUNTER — OUTPATIENT (OUTPATIENT)
Dept: OUTPATIENT SERVICES | Facility: HOSPITAL | Age: 60
LOS: 1 days | End: 2020-11-06

## 2020-11-08 DIAGNOSIS — Z01.818 ENCOUNTER FOR OTHER PREPROCEDURAL EXAMINATION: ICD-10-CM

## 2020-11-09 ENCOUNTER — APPOINTMENT (OUTPATIENT)
Dept: DISASTER EMERGENCY | Facility: CLINIC | Age: 60
End: 2020-11-09

## 2020-11-10 ENCOUNTER — APPOINTMENT (OUTPATIENT)
Dept: CARDIOLOGY | Facility: CLINIC | Age: 60
End: 2020-11-10
Payer: COMMERCIAL

## 2020-11-10 LAB — SARS-COV-2 N GENE NPH QL NAA+PROBE: NOT DETECTED

## 2020-11-10 PROCEDURE — 93306 TTE W/DOPPLER COMPLETE: CPT

## 2020-11-10 PROCEDURE — 99072 ADDL SUPL MATRL&STAF TM PHE: CPT

## 2020-11-12 ENCOUNTER — RESULT REVIEW (OUTPATIENT)
Age: 60
End: 2020-11-12

## 2020-11-12 ENCOUNTER — OUTPATIENT (OUTPATIENT)
Dept: OUTPATIENT SERVICES | Facility: HOSPITAL | Age: 60
LOS: 1 days | End: 2020-11-12
Payer: COMMERCIAL

## 2020-11-12 PROCEDURE — 71045 X-RAY EXAM CHEST 1 VIEW: CPT | Mod: 26

## 2020-11-12 PROCEDURE — 32405: CPT

## 2020-11-12 PROCEDURE — 77012 CT SCAN FOR NEEDLE BIOPSY: CPT | Mod: 26

## 2020-12-21 PROBLEM — Z01.810 ENCOUNTER FOR PREOPERATIVE VASCULAR EXAMINATION: Status: RESOLVED | Noted: 2019-03-29 | Resolved: 2020-12-21

## 2021-05-03 ENCOUNTER — APPOINTMENT (OUTPATIENT)
Dept: CARDIOLOGY | Facility: CLINIC | Age: 61
End: 2021-05-03

## 2021-08-13 ENCOUNTER — APPOINTMENT (OUTPATIENT)
Dept: CARDIOLOGY | Facility: CLINIC | Age: 61
End: 2021-08-13
Payer: COMMERCIAL

## 2021-08-13 VITALS
BODY MASS INDEX: 19.88 KG/M2 | DIASTOLIC BLOOD PRESSURE: 58 MMHG | HEART RATE: 68 BPM | SYSTOLIC BLOOD PRESSURE: 102 MMHG | WEIGHT: 150 LBS | OXYGEN SATURATION: 97 % | HEIGHT: 73 IN

## 2021-08-13 PROCEDURE — 99214 OFFICE O/P EST MOD 30 MIN: CPT

## 2021-08-13 RX ORDER — ASPIRIN 81 MG
81 TABLET, DELAYED RELEASE (ENTERIC COATED) ORAL DAILY
Refills: 0 | Status: DISCONTINUED | COMMUNITY
End: 2021-08-13

## 2021-08-13 RX ORDER — GABAPENTIN 300 MG
300 TABLET ORAL 4 TIMES DAILY
Refills: 0 | Status: ACTIVE | COMMUNITY

## 2021-08-13 NOTE — PHYSICAL EXAM
[General Appearance - Well Developed] : well developed [Normal Appearance] : normal appearance [Well Groomed] : well groomed [General Appearance - Well Nourished] : well nourished [No Deformities] : no deformities [General Appearance - In No Acute Distress] : no acute distress [Normal Conjunctiva] : the conjunctiva exhibited no abnormalities [Respiration, Rhythm And Depth] : normal respiratory rhythm and effort [Exaggerated Use Of Accessory Muscles For Inspiration] : no accessory muscle use [Auscultation Breath Sounds / Voice Sounds] : lungs were clear to auscultation bilaterally [FreeTextEntry1] : msm 1-2/6 mid systolic murmur. no gallop, rub, heave, click, normal PMI. no bruits. [Heart Rate And Rhythm] : heart rate and rhythm were normal [Heart Sounds] : normal S1 and S2 [Arterial Pulses Normal] : the arterial pulses were normal [Edema] : no peripheral edema present [Veins - Varicosity Changes] : no varicosital changes were noted in the lower extremities [Abdomen Soft] : soft [Abdomen Tenderness] : non-tender [Abnormal Walk] : normal gait [Gait - Sufficient For Exercise Testing] : the gait was sufficient for exercise testing [Nail Clubbing] : no clubbing of the fingernails [Cyanosis, Localized] : no localized cyanosis [Skin Color & Pigmentation] : normal skin color and pigmentation [] : no rash [Oriented To Time, Place, And Person] : oriented to person, place, and time [Affect] : the affect was normal [Mood] : the mood was normal [No Anxiety] : not feeling anxious

## 2021-08-13 NOTE — HISTORY OF PRESENT ILLNESS
[FreeTextEntry1] : Hyperlipidemia on statin therapy with Atorvastatin 80mg QD.\par \par History of myocardial infarction, status post right coronary artery drug eluding stent placement\par Mild to moderate reduction in LV ejection fraction with ischemic myopathy.  With subsequent improvement to LV ejection fraction of 55% in November 10, 2020.  On aspirin/Plavix/metoprolol/statins\par \par Erectile dysfunction\par \par Cardiomyopathy: ischemic stable LV ejection fraction with recent echocardiogram\par \par nicotene addiction

## 2021-08-13 NOTE — REVIEW OF SYSTEMS
[Cough] : cough [Wheezing] : no wheezing [Coughing Up Blood] : no hemoptysis [Snoring] : no snoring [Negative] : Heme/Lymph [FreeTextEntry4] : Tracheostomy

## 2021-08-13 NOTE — REASON FOR VISIT
[Symptom and Test Evaluation] : symptom and test evaluation [Hyperlipidemia] : hyperlipidemia [Hypertension] : hypertension [Coronary Artery Disease] : coronary artery disease [FreeTextEntry1] : 60-year-old gentleman comes to follow-up after recent treatment for his cancer follow-up vocal cords.  Had tracheostomy and radiation..\par Continued smoking.\par Today he denies chest pain, pressure, unusual shortness of breath, lightheadedness, dizziness, near syncope or syncope. \par No hospital admission from cardiac point of\par \par

## 2021-08-13 NOTE — DISCUSSION/SUMMARY
[FreeTextEntry1] : PENELOPE ZHOU  is a 60 year M  who presents today with the above history and the following active issues. \par \par \par Coronary artery disease. Native vessel. Prior myocardial infarction. Coronary intervention. Right coronary artery. Initial significant reduction in LV systolic function with ischemic cardiomyopathy. Subsequent improvement in ejection fraction by echo 3/2019.  Stable echocardiogram.  No clinical signs of congestive heart failure.  Continue metoprolol and ACE inhibitor.\par \par Smoking cessation. Relationship with ASCVD, and associated morbidity, mortality was reviewed. Options available discussed.\par \par At present decided with continuation of clopidogrel and stopping aspirin 81 mg\par \par Hyperlipidemia. Continue atorvastatin will along with dietary restrictions.  Labs ordered.\par \par Essential hypertension. Very well controlled. Continue present medications.  No evidence of CHF CKD.\par \par \par Counseling regarding low saturated fat, salt and carbohydrate intake was reviewed. Active lifestyle and regular. Exercise along with weight management is advised.\par All the above were at length reviewed. Answered all the questions. Thank you very much for this kind referral. Please do not hesitate to give me a call for any question.\par Part of this transcription was done with voice recognition software and phonetically similar errors are common. I apologize for that. Please do not hesitate to call for any questions due to above.\par

## 2022-08-18 ENCOUNTER — RX RENEWAL (OUTPATIENT)
Age: 62
End: 2022-08-18

## 2022-11-12 ENCOUNTER — RX RENEWAL (OUTPATIENT)
Age: 62
End: 2022-11-12

## 2023-06-05 ENCOUNTER — APPOINTMENT (OUTPATIENT)
Dept: CARDIOLOGY | Facility: CLINIC | Age: 63
End: 2023-06-05
Payer: MEDICARE

## 2023-06-05 ENCOUNTER — NON-APPOINTMENT (OUTPATIENT)
Age: 63
End: 2023-06-05

## 2023-06-05 VITALS
HEART RATE: 85 BPM | SYSTOLIC BLOOD PRESSURE: 120 MMHG | OXYGEN SATURATION: 97 % | HEIGHT: 73 IN | WEIGHT: 152 LBS | DIASTOLIC BLOOD PRESSURE: 70 MMHG | BODY MASS INDEX: 20.15 KG/M2

## 2023-06-05 DIAGNOSIS — I10 ESSENTIAL (PRIMARY) HYPERTENSION: ICD-10-CM

## 2023-06-05 DIAGNOSIS — I25.10 ATHEROSCLEROTIC HEART DISEASE OF NATIVE CORONARY ARTERY W/OUT ANGINA PECTORIS: ICD-10-CM

## 2023-06-05 DIAGNOSIS — E78.2 MIXED HYPERLIPIDEMIA: ICD-10-CM

## 2023-06-05 DIAGNOSIS — I25.5 ISCHEMIC CARDIOMYOPATHY: ICD-10-CM

## 2023-06-05 DIAGNOSIS — I34.0 NONRHEUMATIC MITRAL (VALVE) INSUFFICIENCY: ICD-10-CM

## 2023-06-05 DIAGNOSIS — Z87.891 PERSONAL HISTORY OF NICOTINE DEPENDENCE: ICD-10-CM

## 2023-06-05 PROCEDURE — 99406 BEHAV CHNG SMOKING 3-10 MIN: CPT

## 2023-06-05 PROCEDURE — 99214 OFFICE O/P EST MOD 30 MIN: CPT

## 2023-06-05 PROCEDURE — 93000 ELECTROCARDIOGRAM COMPLETE: CPT

## 2023-06-05 RX ORDER — PNV NO.95/FERROUS FUM/FOLIC AC 28MG-0.8MG
100 TABLET ORAL DAILY
Refills: 0 | Status: DISCONTINUED | COMMUNITY
End: 2023-06-05

## 2023-06-05 RX ORDER — LEVOTHYROXINE SODIUM 0.07 MG/1
75 TABLET ORAL
Qty: 30 | Refills: 0 | Status: ACTIVE | COMMUNITY
Start: 2023-05-18

## 2023-06-05 NOTE — REASON FOR VISIT
[Symptom and Test Evaluation] : symptom and test evaluation [Hyperlipidemia] : hyperlipidemia [Hypertension] : hypertension [Coronary Artery Disease] : coronary artery disease [FreeTextEntry1] : 62-year-old gentleman comes to follow-up after 2 years.  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 \par No hospital admission from cardiac point of view\par Ran out of his medication\par \par

## 2023-06-05 NOTE — ASSESSMENT
[FreeTextEntry1] : [facility] tests for reference: \par \par Echocardiogram March 22, 2019 EF 50-55%, mild MR\par \par Nuclear stress test March 13, 2019 mod defects in inf/inferolateral inferoseptal wall consistent with infarct. No ischemia \par \par Reviewed on November 2, 2020\par EKG November 2, 2020 as noted above\par Labs done August 6, 2020.  Stable CBC.  Sodium 134 creatinine 0.93 total cholesterol 131 LDL 53 HDL 65 triglycerides 65\par \par Reviewed on fifth 2023.\par EKG as noted above\par Echocardiogram.  2020\par EF 56% moderate mitral regurgitation normal left atrium

## 2023-06-05 NOTE — PHYSICAL EXAM
[Normal] : no edema, no cyanosis, no clubbing, no varicosities [Normal Radial B/L] : normal radial B/L [de-identified] : Tracheostomy [de-identified] : Scattered rhonchi

## 2023-06-05 NOTE — DISCUSSION/SUMMARY
[FreeTextEntry1] : PENELOPE ZHOU  is a 62 year M  who presents today with the above history and the following active issues. \par \par \par Coronary artery disease. Native vessel. Prior myocardial infarction. Coronary intervention. Right coronary artery. Initial significant reduction in LV systolic function with ischemic cardiomyopathy. Subsequent improvement in ejection fraction by echo 3/2019.  Repeat echocardiogram no clinical signs of congestive heart failure.  Continue metoprolol and ACE inhibitor.  Compliance with medication reviewed\par \par Smoking cessation. Relationship with ASCVD, and associated morbidity, mortality was reviewed. Options available discussed.\par \par At present decided with continuation of clopidogrel and stopping aspirin 81 mg\par \par Hyperlipidemia. Continue atorvastatin will along with dietary restrictions.  Labs ordered.  LDL goal less than 70\par \par Essential hypertension. Very well controlled. Continue present medications.  No evidence of CHF CKD.  Goal less than 130/80.  Smoking cessation.\par \par \par Counseling regarding low saturated fat, salt and carbohydrate intake was reviewed. Active lifestyle and regular. Exercise along with weight management is advised.\par All the above were at length reviewed. Answered all the questions. Thank you very much for this kind referral. Please do not hesitate to give me a call for any question.\par Part of this transcription was done with voice recognition software and phonetically similar errors are common. I apologize for that. Please do not hesitate to call for any questions due to above.\par Sincerely,\par Chaya More MD,FACC,MIHAI\par

## 2023-06-05 NOTE — CARDIOLOGY SUMMARY
[___] : [unfilled] [de-identified] : Reviewed on June 5, 2023.  Normal sinus rhythm PVC PAC inferior infarct of undetermined

## 2023-06-05 NOTE — REVIEW OF SYSTEMS
[Negative] : Heme/Lymph [Cough] : no cough [Wheezing] : no wheezing [Coughing Up Blood] : no hemoptysis [Snoring] : no snoring [FreeTextEntry4] : Tracheostomy [FreeTextEntry5] : As per HPI [FreeTextEntry6] : As per HPI

## 2023-06-27 ENCOUNTER — NON-APPOINTMENT (OUTPATIENT)
Age: 63
End: 2023-06-27

## 2023-06-28 ENCOUNTER — RX CHANGE (OUTPATIENT)
Age: 63
End: 2023-06-28

## 2023-07-27 ENCOUNTER — RX CHANGE (OUTPATIENT)
Age: 63
End: 2023-07-27

## 2023-07-28 ENCOUNTER — APPOINTMENT (OUTPATIENT)
Dept: CARDIOLOGY | Facility: CLINIC | Age: 63
End: 2023-07-28
Payer: MEDICARE

## 2023-07-28 PROCEDURE — 93306 TTE W/DOPPLER COMPLETE: CPT

## 2023-07-28 PROCEDURE — 76376 3D RENDER W/INTRP POSTPROCES: CPT

## 2023-08-04 RX ORDER — LISINOPRIL 10 MG/1
10 TABLET ORAL
Qty: 90 | Refills: 0 | Status: ACTIVE | COMMUNITY
Start: 2020-07-05 | End: 1900-01-01

## 2023-11-03 ENCOUNTER — RX RENEWAL (OUTPATIENT)
Age: 63
End: 2023-11-03

## 2023-11-03 RX ORDER — METOPROLOL SUCCINATE 50 MG/1
50 TABLET, EXTENDED RELEASE ORAL
Qty: 90 | Refills: 1 | Status: ACTIVE | COMMUNITY
Start: 2017-11-16 | End: 1900-01-01

## 2024-01-08 RX ORDER — CLOPIDOGREL BISULFATE 75 MG/1
75 TABLET, FILM COATED ORAL
Qty: 30 | Refills: 0 | Status: ACTIVE | COMMUNITY
Start: 2018-01-09 | End: 1900-01-01

## 2024-01-08 RX ORDER — ATORVASTATIN CALCIUM 80 MG/1
80 TABLET, FILM COATED ORAL
Qty: 30 | Refills: 0 | Status: ACTIVE | COMMUNITY
Start: 2020-03-23 | End: 1900-01-01